# Patient Record
Sex: FEMALE | Race: WHITE
[De-identification: names, ages, dates, MRNs, and addresses within clinical notes are randomized per-mention and may not be internally consistent; named-entity substitution may affect disease eponyms.]

---

## 2017-02-22 ENCOUNTER — HOSPITAL ENCOUNTER (INPATIENT)
Dept: HOSPITAL 17 - NEPA | Age: 72
LOS: 2 days | Discharge: HOME | DRG: 812 | End: 2017-02-24
Attending: HOSPITALIST | Admitting: HOSPITALIST
Payer: MEDICARE

## 2017-02-22 VITALS
RESPIRATION RATE: 18 BRPM | HEART RATE: 78 BPM | TEMPERATURE: 97 F | DIASTOLIC BLOOD PRESSURE: 70 MMHG | SYSTOLIC BLOOD PRESSURE: 159 MMHG | OXYGEN SATURATION: 100 %

## 2017-02-22 VITALS
OXYGEN SATURATION: 99 % | SYSTOLIC BLOOD PRESSURE: 137 MMHG | DIASTOLIC BLOOD PRESSURE: 73 MMHG | HEART RATE: 86 BPM | RESPIRATION RATE: 16 BRPM | TEMPERATURE: 97.5 F

## 2017-02-22 VITALS — BODY MASS INDEX: 18.42 KG/M2 | HEIGHT: 66 IN | WEIGHT: 114.64 LBS

## 2017-02-22 VITALS
OXYGEN SATURATION: 100 % | RESPIRATION RATE: 18 BRPM | DIASTOLIC BLOOD PRESSURE: 70 MMHG | SYSTOLIC BLOOD PRESSURE: 169 MMHG | HEART RATE: 70 BPM

## 2017-02-22 VITALS
DIASTOLIC BLOOD PRESSURE: 53 MMHG | TEMPERATURE: 96.9 F | OXYGEN SATURATION: 100 % | SYSTOLIC BLOOD PRESSURE: 97 MMHG | RESPIRATION RATE: 16 BRPM | HEART RATE: 66 BPM

## 2017-02-22 VITALS
HEART RATE: 69 BPM | RESPIRATION RATE: 16 BRPM | TEMPERATURE: 96.4 F | OXYGEN SATURATION: 98 % | SYSTOLIC BLOOD PRESSURE: 138 MMHG | DIASTOLIC BLOOD PRESSURE: 74 MMHG

## 2017-02-22 DIAGNOSIS — D50.9: Primary | ICD-10-CM

## 2017-02-22 DIAGNOSIS — T40.605A: ICD-10-CM

## 2017-02-22 DIAGNOSIS — G89.29: ICD-10-CM

## 2017-02-22 DIAGNOSIS — Z79.1: ICD-10-CM

## 2017-02-22 DIAGNOSIS — K59.03: ICD-10-CM

## 2017-02-22 DIAGNOSIS — M54.5: ICD-10-CM

## 2017-02-22 DIAGNOSIS — K25.9: ICD-10-CM

## 2017-02-22 DIAGNOSIS — K44.9: ICD-10-CM

## 2017-02-22 DIAGNOSIS — D61.818: ICD-10-CM

## 2017-02-22 DIAGNOSIS — K92.2: ICD-10-CM

## 2017-02-22 LAB
ANION GAP SERPL CALC-SCNC: 7 MEQ/L (ref 5–15)
APTT BLD: 22.6 SEC (ref 24.3–30.1)
BASOPHILS # BLD AUTO: 0 TH/MM3 (ref 0–0.2)
BASOPHILS NFR BLD AUTO: 3 % (ref 0–2)
BASOPHILS NFR BLD: 1.8 % (ref 0–2)
BUN SERPL-MCNC: 16 MG/DL (ref 7–18)
CHLORIDE SERPL-SCNC: 108 MEQ/L (ref 98–107)
DACRYOCYTES BLD QL SMEAR: (no result)
EOSINOPHIL # BLD: 0.1 TH/MM3 (ref 0–0.4)
EOSINOPHIL NFR BLD: 1.8 % (ref 0–4)
EOSINOPHIL NFR BLD: 4 % (ref 0–4)
ERYTHROCYTE [DISTWIDTH] IN BLOOD BY AUTOMATED COUNT: 20.3 % (ref 11.6–17.2)
GFR SERPLBLD BASED ON 1.73 SQ M-ARVRAT: 63 ML/MIN (ref 89–?)
HCO3 BLD-SCNC: 25.6 MEQ/L (ref 21–32)
HCT VFR BLD CALC: 24.1 % (ref 35–46)
HEMO FLAGS: (no result)
INR PPP: 0.9 RATIO
LYMPHOCYTES # BLD AUTO: 1.1 TH/MM3 (ref 1–4.8)
LYMPHOCYTES NFR BLD AUTO: 39.2 % (ref 9–44)
MCH RBC QN AUTO: 23.3 PG (ref 27–34)
MCHC RBC AUTO-ENTMCNC: 31.1 % (ref 32–36)
MCV RBC AUTO: 74.8 FL (ref 80–100)
MONOCYTES NFR BLD: 11.3 % (ref 0–8)
NEUTROPHILS # BLD AUTO: 1.3 TH/MM3 (ref 1.8–7.7)
NEUTROPHILS NFR BLD AUTO: 45.9 % (ref 16–70)
NEUTS BAND # BLD MANUAL: 1.5 TH/MM3 (ref 1.8–7.7)
NEUTS BAND NFR BLD: 1 % (ref 0–6)
NEUTS SEG NFR BLD MANUAL: 53 % (ref 16–70)
OVALOCYTES BLD QL SMEAR: (no result)
PLAT MORPH BLD: NORMAL
PLATELET # BLD: 170 TH/MM3 (ref 150–450)
PLATELET BLD QL SMEAR: NORMAL
POTASSIUM SERPL-SCNC: 4 MEQ/L (ref 3.5–5.1)
PROTHROMBIN TIME: 10.3 SEC (ref 9.8–11.6)
RBC # BLD AUTO: 3.22 MIL/MM3 (ref 4–5.3)
SCAN/DIFF: (no result)
SODIUM SERPL-SCNC: 141 MEQ/L (ref 136–145)
TRANSFERRIN IRON PROFILE: 351 MG/DL (ref 200–360)
WBC # BLD AUTO: 2.7 TH/MM3 (ref 4–11)
WBC DIFF SAMPLE: 100

## 2017-02-22 PROCEDURE — 86920 COMPATIBILITY TEST SPIN: CPT

## 2017-02-22 PROCEDURE — 85610 PROTHROMBIN TIME: CPT

## 2017-02-22 PROCEDURE — 93005 ELECTROCARDIOGRAM TRACING: CPT

## 2017-02-22 PROCEDURE — 30233N1 TRANSFUSION OF NONAUTOLOGOUS RED BLOOD CELLS INTO PERIPHERAL VEIN, PERCUTANEOUS APPROACH: ICD-10-PCS | Performed by: EMERGENCY MEDICINE

## 2017-02-22 PROCEDURE — 86901 BLOOD TYPING SEROLOGIC RH(D): CPT

## 2017-02-22 PROCEDURE — 82746 ASSAY OF FOLIC ACID SERUM: CPT

## 2017-02-22 PROCEDURE — 85007 BL SMEAR W/DIFF WBC COUNT: CPT

## 2017-02-22 PROCEDURE — 88305 TISSUE EXAM BY PATHOLOGIST: CPT

## 2017-02-22 PROCEDURE — C9113 INJ PANTOPRAZOLE SODIUM, VIA: HCPCS

## 2017-02-22 PROCEDURE — 82607 VITAMIN B-12: CPT

## 2017-02-22 PROCEDURE — 85730 THROMBOPLASTIN TIME PARTIAL: CPT

## 2017-02-22 PROCEDURE — 83550 IRON BINDING TEST: CPT

## 2017-02-22 PROCEDURE — 71010: CPT

## 2017-02-22 PROCEDURE — 86850 RBC ANTIBODY SCREEN: CPT

## 2017-02-22 PROCEDURE — 36430 TRANSFUSION BLD/BLD COMPNT: CPT

## 2017-02-22 PROCEDURE — 80053 COMPREHEN METABOLIC PANEL: CPT

## 2017-02-22 PROCEDURE — 88312 SPECIAL STAINS GROUP 1: CPT

## 2017-02-22 PROCEDURE — 85027 COMPLETE CBC AUTOMATED: CPT

## 2017-02-22 PROCEDURE — 96374 THER/PROPH/DIAG INJ IV PUSH: CPT

## 2017-02-22 PROCEDURE — P9016 RBC LEUKOCYTES REDUCED: HCPCS

## 2017-02-22 PROCEDURE — 86900 BLOOD TYPING SEROLOGIC ABO: CPT

## 2017-02-22 PROCEDURE — 85025 COMPLETE CBC W/AUTO DIFF WBC: CPT

## 2017-02-22 PROCEDURE — 80048 BASIC METABOLIC PNL TOTAL CA: CPT

## 2017-02-22 PROCEDURE — 83540 ASSAY OF IRON: CPT

## 2017-02-22 RX ADMIN — HYDROCODONE BITARTRATE AND ACETAMINOPHEN PRN TAB: 5; 325 TABLET ORAL at 23:43

## 2017-02-22 RX ADMIN — SODIUM CHLORIDE, PRESERVATIVE FREE SCH ML: 5 INJECTION INTRAVENOUS at 21:34

## 2017-02-22 RX ADMIN — GABAPENTIN SCH MG: 300 CAPSULE ORAL at 21:33

## 2017-02-22 NOTE — HHI.HP
__________________________________________________





hospitals


Service


[facility] [place]


Primary Care Physician


Malik Morgan MD


Admission Diagnosis


symptomatic anemia, GI bleed


Diagnoses:  


(1) Symptomatic anemia


(2) Chronic low back pain


(3) Upper gastrointestinal bleed


Chief Complaint:  


Shortness of breath, fatigue and increased weakness


Travel History


International Travel<30 Days:  No


Contact w/Intl Traveler <30 Da:  No


Traveled to Known Affected Are:  No


History of Present Illness


71 year-old  female with a known history of anemia, presented to the 

ED for evaluation of 6 week history of generalized fatigue, shortness of breath 

and increased weakness and abnormal lab including hemoglobin of 6.8 drawn 

yesterday 17 by her PCP, which patient has seen due to her symptoms.  

Though she denies any melena or GI bleed, she had a positive fecal occult blood 

test in the ED.  Her last colonoscopy was in 2015.  During her last 

hospitalization, hematology was consulted and patient was treated for iron 

deficiency anemia with Venofer transfusion.





Review of Systems


Other 12 systems reviewed and are negative except for the one mentioned in the 

history of present illness





Past Family Social History


Past Medical History


Anemia


Chronic low back pain


Past Surgical History


Back surgery


Reported Medications


See EMR


Allergies:  


Coded Allergies:  


     No Known Allergies (Unverified , 17)


Family History


Father  from complication of cerebral aneurysm





Physical Exam


Vital Signs





 Vital Signs








  Date Time  Temp Pulse Resp B/P Pulse Ox O2 Delivery O2 Flow Rate FiO2


 


17 15:21  74 16  99 Room Air  


 


17 14:20 97.5 86 16 137/73 99 Room Air  








Physical Exam


GENERAL: This is a well-nourished, well-developed patient, in no apparent 

distress.


SKIN: No rashes, ecchymoses or lesions. Cool and dry.


HEAD: Atraumatic. Normocephalic. No temporal or scalp tenderness.


EYES: Pupils equal round and reactive. Extraocular motions intact. No scleral 

icterus. No injection or drainage. 


ENT: Nose without bleeding, purulent drainage or septal hematoma. Throat 

without erythema, tonsillar hypertrophy or exudate. Uvula midline. Airway 

patent.


NECK: Trachea midline. No JVD or lymphadenopathy. Supple, nontender, no 

meningeal signs.


CARDIOVASCULAR: Regular rate and rhythm without murmurs, gallops, or rubs. 


RESPIRATORY: Clear to auscultation. Breath sounds equal bilaterally. No wheezes

, rales, or rhonchi.  


GASTROINTESTINAL: Abdomen soft, non-tender, nondistended. No hepato-splenomegaly

, or palpable masses. No guarding.


MUSCULOSKELETAL: Extremities without clubbing, cyanosis, or edema. No joint 

tenderness, effusion, or edema noted. No calf tenderness. Negative Homans sign 

bilaterally.


NEUROLOGICAL: Awake and alert. Cranial nerves II through XII intact.  Motor and 

sensory grossly within normal limits. Five out of 5 muscle strength in all 

muscle groups.  Normal speech.


Laboratory





Laboratory Tests








Test 17





 15:40 15:50


 


Prothrombin Time 10.3  


 


Prothromb Time International 0.9  





Ratio  


 


Activated Partial 22.6  





Thromboplast Time  


 


Blood Type O POSITIVE  


 


Antibody Screen NEGATIVE  


 


White Blood Count  2.7 


 


Red Blood Count  3.22 


 


Hemoglobin  7.5 


 


Hematocrit  24.1 


 


Mean Corpuscular Volume  74.8 


 


Mean Corpuscular Hemoglobin  23.3 


 


Mean Corpuscular Hemoglobin  31.1 





Concent  


 


Red Cell Distribution Width  20.3 


 


Platelet Count  170 


 


Mean Platelet Volume  8.4 


 


Neutrophils (%) (Auto)  45.9 


 


Lymphocytes (%) (Auto)  39.2 


 


Monocytes (%) (Auto)  11.3 


 


Eosinophils (%) (Auto)  1.8 


 


Basophils (%) (Auto)  1.8 


 


Neutrophils # (Auto)  1.3 


 


Lymphocytes # (Auto)  1.1 


 


Monocytes # (Auto)  0.3 


 


Eosinophils # (Auto)  0.1 


 


Basophils # (Auto)  0.0 


 


CBC Comment  AUTO DIFF 


 


Differential Total Cells  100 





Counted  


 


Neutrophils % (Manual)  53 


 


Band Neutrophils %  1 


 


Lymphocytes %  32 


 


Monocytes %  7 


 


Eosinophils %  4 


 


Basophils %  3 


 


Neutrophils # (Manual)  1.5 


 


Differential Comment  FINAL DIFF





  MANUAL


 


Platelet Estimate  NORMAL 


 


Platelet Morphology Comment  NORMAL 


 


Tear Drop Cells  1+ 


 


Ovalocytes  1+ 


 


Sodium Level  141 


 


Potassium Level  4.0 


 


Chloride Level  108 


 


Carbon Dioxide Level  25.6 


 


Anion Gap  7 


 


Blood Urea Nitrogen  16 


 


Creatinine  0.88 


 


Estimat Glomerular Filtration  63 





Rate  


 


Random Glucose  90 


 


Calcium Level  8.8 








Result Diagram:  


17 1550                                                                   

             17 1550





Imaging





Last Impressions








Chest X-Ray 17 0000 Signed





Impressions: 





 Service Date/Time:  2017 15:58 - CONCLUSION: No acute 





 disease     Bora Banerjee MD 











Assessment and Plan


Problem List:  


(1) Upper gastrointestinal bleed


ICD Code:  K92.2


Status:  Acute


(2) Symptomatic anemia


ICD Code:  D64.9


Status:  Acute


Assessment and Plan


71-year-old female with





1-Symptomatic anemia: H&H 7.5/24.1 and last H&H on 16 of 14.7/46.2; will 

transfuse 1 unit packed red blood cell now need further evaluation for GI.  

Check Iron profile and treat accordingly.  Consider hematology consultation


2-GI bleed: As patient with fecal occult blood test positive along with 

symptomatic anemia, consult gastroenterology for evaluation for possible 

panendoscopy.  PPI.


3-History of chronic low back pain: Resume outpatient medications


4-DVT prophylaxis: Bilateral SCDs


Code Status


Full code


Discussed Condition With


Patient, , ED physician





Physician Certification


2 Midnight Certification Type:  Admission for Inpatient Services


Order for Inpatient Services


The services are ordered in accordance with Medicare regulations or non-

Medicare payer requirements, as applicable.  In the case of services not 

specified as inpatient-only, they are appropriately provided as inpatient 

services in accordance with the 2-midnight benchmark.


Estimated LOS (days):  2


 days is the estimated time the patient will need to remain in the hospital, 

assuming treatment plan goals are met and no additional complications.


Post-Hospital Plan:  Not yet determined








Ronald Mendoza MD 2017 17:42

## 2017-02-22 NOTE — RADRPT
EXAM DATE/TIME:  02/22/2017 15:58 

 

[facility] COMPARISON:     

CTA ABDOMEN & PELVIS W 3D RECON, November 07, 2015, 20:54.  CHEST PA & LAT, February 23, 2016, 12:31.


 

                     

INDICATIONS :     

Shortness of breath. 

                     

 

MEDICAL HISTORY :     

None.          

 

SURGICAL HISTORY :     

None.   

 

ENCOUNTER:     

Subsequent                                        

 

ACUITY:     

2 weeks      

 

PAIN SCORE:     

0/10

 

LOCATION:      

chest 

 

FINDINGS:     

Retrocardiac double density is consistent with hiatal hernia. There is mild left base atelectasis. Tia
ngs otherwise clear. Inspected. Mediastinal contours are satisfactory.

 

CONCLUSION:     No acute disease

 

 

 

 Bora Banerjee MD on February 22, 2017 at 16:13           

Board Certified Radiologist.

 This report was verified electronically.

## 2017-02-22 NOTE — PD
HPI


Chief Complaint:  Abnormal Results


Time Seen by Provider:  15:20


Travel History


International Travel<30 days:  No


Contact w/Intl Traveler<30days:  No


Traveled to known affect area:  No





History of Present Illness


HPI


The patient was seen and examined in the presence of the nurse.  This patient 

complains of shortness of breath and fatigue.  She has history of chronic 

anemia from iron deficiency.  She had an outpatient blood draw yesterday that 

showed a hemoglobin of 6.8 per patient report.  She denies melena or GI 

bleeding.  Her last colonoscopy was November 2015.  Symptom severity is 

moderate.  No productive cough or chest pain or fever.  No alleviating factors.

  No history of cardiac or pulmonary disease.  Duration one week





PFSH


Past Medical History


Anemia:  Yes


Arthritis:  No


Asthma:  No


Blood Disorders:  No


Anxiety:  No


Depression:  No


Heart Rhythm Problems:  No


Cancer:  No


Cardiovascular Problems:  No


High Cholesterol:  No


Chemotherapy:  No


Chest Pain:  No


Congestive Heart Failure:  No


COPD:  No


Cerebrovascular Accident:  No


Diabetes:  No


Endocrine:  No


Gastrointestinal Disorders:  Yes (gerd, ulcers in the past)


GERD:  No


Genitourinary:  No


Hepatitis:  No


Hiatal Hernia:  Yes


Immune Disorder:  No


Kidney Stones:  No


Musculoskeletal:  No


Neurologic:  Yes (numbness r leg with weakness, foot drop)


Psychiatric:  Yes (ADD)


Reproductive:  No


Respiratory:  No


Migraines:  No


Radiation Therapy:  No


Renal Failure:  No


Seizures:  No


Sleep Apnea:  No


Thyroid Disease:  No


Ulcer:  No


Pregnant?:  Not Pregnant





Past Surgical History


Abdominal Surgery:  No


AICD:  No


Cardiac Surgery:  No


Ear Surgery:  No


Endocrine Surgery:  No


Eye Surgery:  No


Genitourinary Surgery:  No


Gynecologic Surgery:  No


Joint Replacement:  No


Oral Surgery:  No


Pacemaker:  No


Thoracic Surgery:  No


Tonsillectomy:  Yes


Other Surgery:  Yes (back surgery/ disc )





Social History


Alcohol Use:  Yes (DAILY, 2-3 MIXED DRINKS)


Tobacco Use:  No


Substance Use:  No





Allergies-Medications


(Allergen,Severity, Reaction):  


Coded Allergies:  


     No Known Allergies (Unverified , 2/22/17)


Reported Meds & Prescriptions





Reported Meds & Active Scripts


Active


Reported


Gabapentin 300 Mg Cap 300 Mg PO BID


Tizanidine (Tizanidine HCl) 2 Mg Tab 2 Mg PO HS


Hydrocodone-Acetaminophen  mg Tab 1 Tab PO Q6H PRN


Adderall (Amphetamine-Dextroamphetamine) 20 Mg Tab 20 Mg PO BID


     Avoid late evening doses. Space doses at least 4 to 6 hours if more


     than once/day dosing.








Review of Systems


General / Constitutional:  No: Fever


Eyes:  No: Visual changes


HENT:  No: Headaches


Cardiovascular:  No: Chest Pain or Discomfort


Respiratory:  Positive: Shortness of Breath


Gastrointestinal:  No: Abdominal Pain


Genitourinary:  No: Dysuria


Musculoskeletal:  Positive: Weakness,  No: Pain


Skin:  No Rash


Neurologic:  Positive: Weakness


Psychiatric:  No: Depression


Endocrine:  No: Polydipsia


Hematologic/Lymphatic:  No: Easy Bruising





Physical Exam


Narrative


GENERAL: Thin elderly well-developed patient in no apparent distress.


SKIN: Warm and dry.


HEAD: Atraumatic. Normocephalic. 


EYES: Pupils equal and round. No scleral icterus. No injection or drainage. 


ENT: No nasal bleeding or discharge.  Mucous membranes pink and moist.


NECK: Trachea midline. No JVD. 


CARDIOVASCULAR: Regular rate and rhythm.  No murmur appreciated.


RESPIRATORY: No accessory muscle use. Clear to auscultation. Breath sounds 

equal bilaterally. 


GASTROINTESTINAL: Abdomen soft, non-tender, nondistended. Hepatic and splenic 

margins not palpable. 


MUSCULOSKELETAL: No obvious deformities. No clubbing.  No cyanosis.  No edema. 


NEUROLOGICAL: Awake and alert. No obvious cranial nerve deficits.  Motor 

grossly within normal limits. Normal speech.


PSYCHIATRIC: Appropriate mood and affect; insight and judgment normal.


Rectal: Normal tone, no mass, stool is dark brown





Data


Data


Last Documented VS





Vital Signs








  Date Time  Temp Pulse Resp B/P Pulse Ox O2 Delivery O2 Flow Rate FiO2


 


2/22/17 15:21  74 16  99 Room Air  


 


2/22/17 14:20 97.5   137/73    








Orders





 Complete Blood Count With Diff (2/22/17 15:30)


Basic Metabolic Panel (Bmp) (2/22/17 15:30)


Iv Access Insert/Monitor (2/22/17 15:30)


Chest, Single Ap (2/22/17 )


Electrocardiogram (2/22/17 )


Prothrombin Time / Inr (Pt) (2/22/17 15:52)


Act Partial Throm Time (Ptt) (2/22/17 15:52)


Type And Screen (2/22/17 15:52)


Enalaprilat Inj (Vasotec  Inj) (2/22/17 16:00)


Admit Order (Ed Use Only) (2/22/17 16:45)





Labs








 Laboratory Tests








Test 2/22/17 2/22/17





 15:40 15:50


 


Prothrombin Time 10.3 SEC 


 


Prothromb Time International 0.9 RATIO 





Ratio  


 


Activated Partial 22.6 SEC 





Thromboplast Time  


 


White Blood Count  2.7 TH/MM3


 


Red Blood Count  3.22 MIL/MM3


 


Hemoglobin  7.5 GM/DL


 


Hematocrit  24.1 %


 


Mean Corpuscular Volume  74.8 FL


 


Mean Corpuscular Hemoglobin  23.3 PG


 


Mean Corpuscular Hemoglobin  31.1 %





Concent  


 


Red Cell Distribution Width  20.3 %


 


Platelet Count  170 TH/MM3


 


Mean Platelet Volume  8.4 FL


 


Neutrophils (%) (Auto)  45.9 %


 


Lymphocytes (%) (Auto)  39.2 %


 


Monocytes (%) (Auto)  11.3 %


 


Eosinophils (%) (Auto)  1.8 %


 


Basophils (%) (Auto)  1.8 %


 


Neutrophils # (Auto)  1.3 TH/MM3


 


Lymphocytes # (Auto)  1.1 TH/MM3


 


Monocytes # (Auto)  0.3 TH/MM3


 


Eosinophils # (Auto)  0.1 TH/MM3


 


Basophils # (Auto)  0.0 TH/MM3


 


CBC Comment  AUTO DIFF 


 


Sodium Level  141 MEQ/L


 


Potassium Level  4.0 MEQ/L


 


Chloride Level  108 MEQ/L


 


Calcium Level  8.8 MG/DL














MDM


Medical Decision Making


Medical Screen Exam Complete:  Yes


Emergency Medical Condition:  Yes


Medical Record Reviewed:  Yes


Differential Diagnosis


Gastric ulcer, esophagitis, gastritis, symptomatic anemia


Narrative Course


I have reviewed the patient's electronic medical record.  Reviewed GI 

consultation from 2015 as well as hematology consultation.  She received iron 

therapy and her hemoglobin was 14.7 in June 2016





IV placed


CBC shows hemoglobin of 7.5 with leukopenia and normal platelet count


Metabolic profile shows normal sodium and potassium, creatinine still pending


Coagulation studies are normal


Type and screen was sent


Stool is Hemoccult positive suggesting GI loss as  the cause


I gave her dose of IV Protonix


Patient has symptomatic anemia from suspected upper GI bleed


Case reviewed with hospitalist will admit


I reviewed her EKG which shows sinus rhythm but no ST elevation


I reviewed her chest x-ray which is normal


Extended cardiac monitoring reveals sinus rhythm with occasional PVC.  I 

believe her dyspnea is a manifestation of symptomatic anemia and not pulmonary 

disease





[facility] Point of Care


Internal Pos. & Neg. Controls:  Passed


Fecal Specimen Occult Blood:  Positive





Diagnosis





 Primary Impression:  


 Symptomatic anemia


 Additional Impression:  


 Upper gastrointestinal bleed





Admitting Information


Admitting Physician Requests:  Admit








Gagandeep Car MD Feb 22, 2017 15:58

## 2017-02-22 NOTE — EKG
Date Performed: 02/22/2017       Time Performed: 16:28:37

 

PTAGE:      71 years

 

EKG:      NORMAL Sinus rhythm 

 

 WITH PACS INCOMPLETE RIGHT BUNDLE BRANCH BLOCK ABNORMAL RHYTHM ECG

 

PREVIOUS TRACING       : 02/23/2016 11.46 Compared to prior tracing no significant change

 

DOCTOR:   Carlos Khan  Interpretating Date/Time  02/22/2017 22:47:04

## 2017-02-23 VITALS
SYSTOLIC BLOOD PRESSURE: 140 MMHG | RESPIRATION RATE: 16 BRPM | HEART RATE: 65 BPM | TEMPERATURE: 97.1 F | OXYGEN SATURATION: 98 % | DIASTOLIC BLOOD PRESSURE: 70 MMHG

## 2017-02-23 VITALS
SYSTOLIC BLOOD PRESSURE: 186 MMHG | DIASTOLIC BLOOD PRESSURE: 85 MMHG | OXYGEN SATURATION: 96 % | RESPIRATION RATE: 16 BRPM | TEMPERATURE: 97.1 F | HEART RATE: 69 BPM

## 2017-02-23 VITALS
DIASTOLIC BLOOD PRESSURE: 83 MMHG | OXYGEN SATURATION: 100 % | RESPIRATION RATE: 16 BRPM | SYSTOLIC BLOOD PRESSURE: 188 MMHG | TEMPERATURE: 96.6 F | HEART RATE: 60 BPM

## 2017-02-23 VITALS
SYSTOLIC BLOOD PRESSURE: 122 MMHG | HEART RATE: 61 BPM | RESPIRATION RATE: 16 BRPM | OXYGEN SATURATION: 98 % | DIASTOLIC BLOOD PRESSURE: 78 MMHG | TEMPERATURE: 96.8 F

## 2017-02-23 VITALS
TEMPERATURE: 96.7 F | RESPIRATION RATE: 16 BRPM | HEART RATE: 77 BPM | DIASTOLIC BLOOD PRESSURE: 77 MMHG | OXYGEN SATURATION: 98 % | SYSTOLIC BLOOD PRESSURE: 145 MMHG

## 2017-02-23 VITALS
OXYGEN SATURATION: 98 % | DIASTOLIC BLOOD PRESSURE: 74 MMHG | HEART RATE: 72 BPM | TEMPERATURE: 96.2 F | SYSTOLIC BLOOD PRESSURE: 128 MMHG | RESPIRATION RATE: 16 BRPM

## 2017-02-23 VITALS — OXYGEN SATURATION: 98 %

## 2017-02-23 LAB
ALP SERPL-CCNC: 74 U/L (ref 45–117)
ALT SERPL-CCNC: 15 U/L (ref 10–53)
ANION GAP SERPL CALC-SCNC: 7 MEQ/L (ref 5–15)
AST SERPL-CCNC: 11 U/L (ref 15–37)
BASOPHILS # BLD AUTO: 0 TH/MM3 (ref 0–0.2)
BASOPHILS NFR BLD: 1.4 % (ref 0–2)
BILIRUB SERPL-MCNC: 0.3 MG/DL (ref 0.2–1)
BUN SERPL-MCNC: 13 MG/DL (ref 7–18)
CHLORIDE SERPL-SCNC: 108 MEQ/L (ref 98–107)
EOSINOPHIL # BLD: 0.1 TH/MM3 (ref 0–0.4)
EOSINOPHIL NFR BLD: 3.2 % (ref 0–4)
ERYTHROCYTE [DISTWIDTH] IN BLOOD BY AUTOMATED COUNT: 19.6 % (ref 11.6–17.2)
GFR SERPLBLD BASED ON 1.73 SQ M-ARVRAT: 95 ML/MIN (ref 89–?)
HCO3 BLD-SCNC: 26.1 MEQ/L (ref 21–32)
HCT VFR BLD CALC: 25.1 % (ref 35–46)
HEMO FLAGS: (no result)
LYMPHOCYTES # BLD AUTO: 0.7 TH/MM3 (ref 1–4.8)
LYMPHOCYTES NFR BLD AUTO: 29.8 % (ref 9–44)
MCH RBC QN AUTO: 24 PG (ref 27–34)
MCHC RBC AUTO-ENTMCNC: 31.4 % (ref 32–36)
MCV RBC AUTO: 76.3 FL (ref 80–100)
MONOCYTES NFR BLD: 11.9 % (ref 0–8)
NEUTROPHILS # BLD AUTO: 1.3 TH/MM3 (ref 1.8–7.7)
NEUTROPHILS NFR BLD AUTO: 53.7 % (ref 16–70)
PLATELET # BLD: 144 TH/MM3 (ref 150–450)
POTASSIUM SERPL-SCNC: 3.7 MEQ/L (ref 3.5–5.1)
RBC # BLD AUTO: 3.29 MIL/MM3 (ref 4–5.3)
SCAN/DIFF: (no result)
SODIUM SERPL-SCNC: 141 MEQ/L (ref 136–145)
TARGETS BLD QL SMEAR: (no result)
VIT B12 SERPL-MCNC: 1076 PG/ML (ref 193–986)
WBC # BLD AUTO: 2.5 TH/MM3 (ref 4–11)

## 2017-02-23 RX ADMIN — SODIUM CHLORIDE, PRESERVATIVE FREE SCH ML: 5 INJECTION INTRAVENOUS at 09:19

## 2017-02-23 RX ADMIN — GABAPENTIN SCH MG: 300 CAPSULE ORAL at 09:19

## 2017-02-23 RX ADMIN — SODIUM CHLORIDE, PRESERVATIVE FREE SCH ML: 5 INJECTION INTRAVENOUS at 20:29

## 2017-02-23 RX ADMIN — GABAPENTIN SCH MG: 300 CAPSULE ORAL at 20:26

## 2017-02-23 RX ADMIN — PANTOPRAZOLE SCH MG: 40 TABLET, DELAYED RELEASE ORAL at 09:19

## 2017-02-23 RX ADMIN — DEXTROAMPHETAMINE SACCHARATE, AMPHETAMINE ASPARTATE, DEXTROAMPHETAMINE SULFATE, AND AMPHETAMINE SULFATE SCH MG: 5; 5; 5; 5 TABLET ORAL at 08:00

## 2017-02-23 RX ADMIN — DEXTROAMPHETAMINE SACCHARATE, AMPHETAMINE ASPARTATE, DEXTROAMPHETAMINE SULFATE, AND AMPHETAMINE SULFATE SCH MG: 5; 5; 5; 5 TABLET ORAL at 15:19

## 2017-02-23 RX ADMIN — HYDROCODONE BITARTRATE AND ACETAMINOPHEN PRN TAB: 10; 325 TABLET ORAL at 15:22

## 2017-02-23 RX ADMIN — HYDROCODONE BITARTRATE AND ACETAMINOPHEN PRN TAB: 10; 325 TABLET ORAL at 23:36

## 2017-02-23 RX ADMIN — SODIUM CHLORIDE SCH MLS/HR: 900 INJECTION, SOLUTION INTRAVENOUS at 15:19

## 2017-02-23 RX ADMIN — HYDROCODONE BITARTRATE AND ACETAMINOPHEN PRN TAB: 5; 325 TABLET ORAL at 09:21

## 2017-02-23 NOTE — MB
cc:

RONALD PINZON RUBY ANNE E. M.D.

****

 

 

DATE OF CONSULTATION:  2017

 

 1945

 

REFERRING PHYSICIAN

Dr. Ronald Pinzon.

 

CHIEF COMPLAINT

Dr. Pinzon requested consultation for Mrs. Klein regarding iron deficiency

anemia.

 

HISTORY OF PRESENT ILLNESS

Mrs. Klein is a 71-year-old woman, well-known patient from hematology clinic.

She was initially seen 2015.  At that time she presented with viral

gastroenteritis and she was having nausea and vomiting.  She was referred to

the emergency room and her hemoglobin was found to be 6.2.  She was confirmed

to have iron deficiency.  She was treated with parenteral iron therapy and did

extremely well.  She responded well to the iron treatment and was followed in

hematology clinic for sometime.  Her hemoglobin remained stable.  Her last

appointment with me was on May 24, 2016.  Her hemoglobin of 14.7.  Her ferritin

was 96.  She was discharged from hematology clinic to follow up with her

primary physician.  She was agreeable to this.

 

Mrs. Klein describes feeling fatigue in retrospect, possibly weeks prior to

coming in.  She describes having a blood test coordinated by Dr. Eddie arroyo

be in January, suggested that she has recurrence of her anemia.  She started to

chew more ice. She went to [place] to visit some family.  She returned to have

a hemoglobin repeated and was found to have a hemoglobin of 6.8.  She was

referred to the emergency room.  She denies any overt bleeding.  She denies any

abdominal discomfort.  She has noted some bruising.  She admits to taking an

occasional ibuprofen for her chronic pain.  She is aware of the need to avoid

the ibuprofen in light of her history of GI bleeding.

 

On admission to the hospital she was found to have a hemoglobin 7.5, she was

hemodynamically stable.  Her platelet count was normal, white blood cell count

was slightly decreased. She was treated with parenteral iron therapy.  Her

hemoglobin increased to 7.9.  She is feeling better. A ferritin was not

performed but her laboratory evaluation is consistent with iron deficiency with

1.6% saturation and serum iron of 8, TIBC was elevated at 491.  She denies any

GI symptoms.  She is eating well.  She denies any symptoms from her hiatal

hernia.  She has no sick contact.  She is feeling fatigue, is the most

complaint and short of breath.  She feels that she should have come into

hematology clinic before.  The rest of her review of systems is negative.

 

PAST MEDICAL HISTORY

1.   Iron deficiency anemia.

2.   Chronic pain.

3.   History of gastroenteritis.

4.   Leukopenia.

 

FAMILY HISTORY

Father  of cerebral aneurysm in his 50s.  Mother  of a stroke in her

80s.

 

SOCIAL HISTORY

She denies any alcohol or illicit drug use.  She drinks wine and cocktails on a

daily basis.

 

ALLERGIES

NO KNOWN DRUG ALLERGIES.

 

PAST SURGICAL HISTORY

Back surgery.

 

CURRENT MEDICATIONS

1. Iron sucrose.

2. Protonix.

3. Neurontin.

4. Zanaflex.

 

PHYSICAL EXAMINATION

VITAL SIGNS: Temperature 96.2, heart rate 72, respiratory rate 16, blood

pressure 128/74, saturation 98%.

GENERAL:  Mrs. Klein is a well-developed, well-nourished petite elderly woman in

no acute distress.  She has fair skin and some pallor.  Her pupils are round,

reactive to light and accommodation.  Conjunctivae is pale.  Oropharynx is

clear.

NECK: Supple.

LUNGS: Clear to auscultation.

CARDIOVASCULAR: Exam reveals normal rate, rhythm.

ABDOMEN: Abdomen is benign.

EXTREMITIES: Lower extremities with no edema.  She has senile purpura, bruising

of her forearms.

 

LABORATORY DATA

Labs as described above.  Chemistry with a BUN of 13, creatinine 0.62.

 

ASSESSMENT/PLAN

Mrs. Klein is a 71-year-old woman, well-known patient with history of iron

deficiency anemia secondary to GI blood loss.  We discussed at length her risk

is the hiatal hernia which at the time is giving her no specific symptoms.  I

defer to GI for further evaluation.  She has had extensive GI evaluation that

has been negative in the past.  The only evidence we have of GI loss of blood

is the recurrence of her iron deficiency after it has been completely corrected

in May 2016.

 

I concur with Dr. Pinzon of giving her parenteral iron therapy.  I recommend

withholding blood transfusion as she is hemodynamically stable.  She responded

to iron Injectafer in the past. We could coordinate this in our clinic.  She is

quite familiar with the clinic system and will obtain approval and anticipate

treating her on Monday.  We will monitor closely her progress during her

hospital course.  She has no overt signs of bleeding.  She is about to have

lunch and is doing well.  She is eager to go home.  She is recently .

Her questions were answered to her satisfaction.

 

 

                              _________________________________

                              MD KATHY Edgar

D:  2017/2:09 PM

T:  2017/2:22 PM

Visit #:  [id number]

Job #:  [id number]

## 2017-02-23 NOTE — PD.CONS
HPI


History of Present Illness


This is a 71 year old lady with a hx anemia who presented with sob on exertion 

and fatigue. She had an outpt blood draw a few days ago that showed hgb 6.8.  

About 2 months ago she began experiencing gradual onset of these symptoms.  In 

 she experienced similar symptoms and hgb less than 7 and was treated with 

IV iron and received blood and her hgb and symptoms improved greatly.  At that 

time, 2015, she also had colonoscopy with poor prep and recommended repeat 

in 1 year, and EGD which showed a large ulcer and a hiatal hernia.  She says at 

this time she was taking daily ibuprofen for back pain.  She also had a capsule 

endoscopy 10y prior which did not find any bleeding.  She has a hx back pain 

for which she had surgery last year and still bothers her.  She takes 

hydrocodone daily and has constipation which she controls with benefiber and 

stool softeners.  No family hx cancer and no one else in her family has 

problems with anemia but she says she has had it her whole life.  She also 

bruises easily which she said she has experienced for the last 10 years.  She 

denies abdominal pain, n/v, hematemesis, hematochezia, melena.





PFSH


Past Medical History


Anemia


Chronic low back pain


hiatal hernia


Past Surgical History


Back surgery


Coded Allergies:  


     No Known Allergies (Unverified , 17)


Family History


Father  from complication of cerebral aneurysm





Review of Systems


Constitutional:  COMPLAINS OF: Fatigue,  DENIES: Diaphoretic episodes, Fever, 

Weight gain, Weight loss, Chills, Dizziness, Change in appetite, Night Sweats


Respiratory:  COMPLAINS OF: Shortness of breath


Gastrointestinal:  DENIES: Abdominal pain, Black stools, Bloody stools, 

Constipation, Diarrhea, Nausea, Vomiting, Difficulty Swallowing, Anorexia, 

Odynophagia, Swelling of Abdomen, Heartburn, Hematemesis


Musculoskeletal:  COMPLAINS OF: Back pain


Hematologic/lymphatic:  COMPLAINS OF: Bruising





GI Exam


Vitals I&O





 Vital Signs








  Date Time  Temp Pulse Resp B/P Pulse Ox O2 Delivery O2 Flow Rate FiO2


 


17 12:00 96.2 72 16 128/74 98   


 


17 10:05     98   


 


17 08:00 97.1 69 16 186/85 96   


 


17 05:00 97.1 65 16 140/70 98   


 


17 01:20 96.8 61 16 122/78 98   


 


17 00:57     98   


 


17 22:52 96.9 66 16 97/53 100   


 


17 22:30 96.4 69 16 138/74 98   


 


17 21:40 97.0 78 18 159/70 100   


 


17 18:12  70 18 169/70 100 Room Air  


 


17 15:21  74 16  99 Room Air  








 I/O








 17





 07:00 15:00 23:00 07:00 15:00 23:00


 


Intake Total    200 ml 480 ml 


 


Output Total     1100 ml 


 


Balance    200 ml -620 ml 


 


      


 


Intake Oral    200 ml 480 ml 


 


Output Urine Total     1100 ml 


 


# Voids   1 2  


 


# Bowel Movements    0  








Laboratory











Test 17





 15:40 15:50 17:34 08:02


 


Prothrombin Time 10.3 SEC   


 


Prothromb Time International 0.9 RATIO   





Ratio    


 


Activated Partial 22.6 SEC   





Thromboplast Time    


 


Blood Type O POSITIVE    


 


Antibody Screen NEGATIVE    


 


White Blood Count  2.7 TH/MM3  2.5 TH/MM3


 


Red Blood Count  3.22 MIL/MM3  3.29 MIL/MM3


 


Hemoglobin  7.5 GM/DL  7.9 GM/DL


 


Hematocrit  24.1 %  25.1 %


 


Mean Corpuscular Volume  74.8 FL  76.3 FL


 


Mean Corpuscular Hemoglobin  23.3 PG  24.0 PG


 


Mean Corpuscular Hemoglobin  31.1 %  31.4 %





Concent    


 


Red Cell Distribution Width  20.3 %  19.6 %


 


Platelet Count  170 TH/MM3  144 TH/MM3


 


Mean Platelet Volume  8.4 FL  8.3 FL


 


Neutrophils (%) (Auto)  45.9 %  53.7 %


 


Lymphocytes (%) (Auto)  39.2 %  29.8 %


 


Monocytes (%) (Auto)  11.3 %  11.9 %


 


Eosinophils (%) (Auto)  1.8 %  3.2 %


 


Basophils (%) (Auto)  1.8 %  1.4 %


 


Neutrophils # (Auto)  1.3 TH/MM3  1.3 TH/MM3


 


Lymphocytes # (Auto)  1.1 TH/MM3  0.7 TH/MM3


 


Monocytes # (Auto)  0.3 TH/MM3  0.3 TH/MM3


 


Eosinophils # (Auto)  0.1 TH/MM3  0.1 TH/MM3


 


Basophils # (Auto)  0.0 TH/MM3  0.0 TH/MM3


 


CBC Comment  AUTO DIFF   AUTO DIFF 


 


Differential Total Cells  100   





Counted    


 


Neutrophils % (Manual)  53 %  


 


Band Neutrophils %  1 %  


 


Lymphocytes %  32 %  


 


Monocytes %  7 %  


 


Eosinophils %  4 %  


 


Basophils %  3 %  


 


Neutrophils # (Manual)  1.5 TH/MM3  


 


Differential Comment  FINAL DIFF  AUTO DIFF





  MANUAL  CONFIRMED


 


Platelet Estimate  NORMAL   


 


Platelet Morphology Comment  NORMAL   


 


Tear Drop Cells  1+   


 


Ovalocytes  1+   


 


Sodium Level  141 MEQ/L  141 MEQ/L


 


Potassium Level  4.0 MEQ/L  3.7 MEQ/L


 


Chloride Level  108 MEQ/L  108 MEQ/L


 


Carbon Dioxide Level  25.6 MEQ/L  26.1 MEQ/L


 


Anion Gap  7 MEQ/L  7 MEQ/L


 


Blood Urea Nitrogen  16 MG/DL  13 MG/DL


 


Creatinine  0.88 MG/DL  0.62 MG/DL


 


Estimat Glomerular Filtration  63 ML/MIN  95 ML/MIN





Rate    


 


Random Glucose  90 MG/DL  84 MG/DL


 


Calcium Level  8.8 MG/DL  8.4 MG/DL


 


Iron Level  8 MCG/DL  


 


Total Iron Binding Capacity  491 MCG/DL  


 


Percent Iron Saturation  1.6 %  


 


Crossmatch   Leukocyte-Reduced 





   Red Blood 





   Cells 


 


Blood Bank Comment     


 


Target Cells    1+ 


 


Total Bilirubin    0.3 MG/DL


 


Aspartate Amino Transf    11 U/L





(AST/SGOT)    


 


Alanine Aminotransferase    15 U/L





(ALT/SGPT)    


 


Alkaline Phosphatase    74 U/L


 


Total Protein    6.0 GM/DL


 


Albumin    3.1 GM/DL


 


Vitamin B12 Level    1076 PG/ML


 


Folate    7.8 NG/ML








Physical Examination


HEENT: normocephalic; atraumatic; no jaundice.


CHEST:  CTA


CARDIAC:  RRR


ABDOMEN:  Soft, nondistended, nontender; no hepatosplenomegaly; bowel sounds 

are present in all four quadrants.


EXTREMITIES: No clubbing, cyanosis, or edema.


SKIN:  Normal; no rash; no jaundice.


CNS:  No focal deficits; alert and oriented times three.





Assessment and Plan


Plan


ASSESSMENT


- Iron deficiency Anemia/hemocult pos, HH 7.9/25.1, Iron 8, TIBC 491.  Getting 

iron & blood transfusions.  Pt has hx anemia & peptic ulcer disease and last 

had EGD/colonoscopy  which showed large ulcer, hiatal hernia, chemical 

gastropathy. Colonoscopy prep was poor.  Capsule endoscopy 10y prior and showed 

no bleeding. PPI


- Hx chronic low back pain  





PLAN


- EGD/colonoscopy


- obtain consents


- clear liquids and NPO after midnight


- golytely prep


- PPI


- iron transfusions


- hematology following


- monitor HH


- transfuse as necessary


-This patient was seen by myself and Dr. Moe and this note is written on 

his behalf








Evelin Strong 2017 15:35

## 2017-02-23 NOTE — HHI.PR
Subjective


Remarks


Follow-up symptomatic anemia/GI bleed /iron deficiency anemia


02/23/17-patient seen and examined; she was transfused 1 unit packed red blood 

cell and no acute event overnight.  No report of GI bleed, hematuria or 

hemoptysis.





Objective


Vitals





 Vital Signs








  Date Time  Temp Pulse Resp B/P Pulse Ox O2 Delivery O2 Flow Rate FiO2


 


2/23/17 05:00 97.1 65 16 140/70 98   


 


2/23/17 01:20 96.8 61 16 122/78 98   


 


2/23/17 00:57     98   


 


2/22/17 22:52 96.9 66 16 97/53 100   


 


2/22/17 22:30 96.4 69 16 138/74 98   


 


2/22/17 21:40 97.0 78 18 159/70 100   


 


2/22/17 18:12  70 18 169/70 100 Room Air  


 


2/22/17 15:21  74 16  99 Room Air  


 


2/22/17 14:20 97.5 86 16 137/73 99 Room Air  








 I/O








 2/22/17 2/22/17 2/22/17 2/23/17 2/23/17 2/23/17





 07:00 15:00 23:00 07:00 15:00 23:00


 


Intake Total    200 ml  


 


Balance    200 ml  


 


      


 


Intake Oral    200 ml  


 


# Voids   1 2  


 


# Bowel Movements    0  








Result Diagram:  


2/22/17 1550                                                                   

             2/22/17 1550





Imaging





Last Impressions








Chest X-Ray 2/22/17 0000 Signed





Impressions: 





 Service Date/Time:  Wednesday, February 22, 2017 15:58 - CONCLUSION: No acute 





 disease     Bora Banerjee MD 








Objective Remarks


GENERAL: NAD


SKIN: Warm and dry.


HEAD: Normocephalic.


EYES: No scleral icterus. No injection or drainage. 


NECK: Supple, trachea midline. No JVD or lymphadenopathy.


CARDIOVASCULAR: Regular rate and rhythm without murmurs, gallops, or rubs. 


RESPIRATORY: Breath sounds equal bilaterally. No accessory muscle use.


GASTROINTESTINAL: Abdomen soft, non-tender, nondistended. 


MUSCULOSKELETAL: No cyanosis, or edema. 


BACK: Nontender without obvious deformity. No CVA tenderness.








A/P


Problem List:  


(1) Upper gastrointestinal bleed


ICD Code:  K92.2


Status:  Acute


(2) Symptomatic anemia


ICD Code:  D64.9


Status:  Acute


(3) Iron deficiency anemia


ICD Code:  D50.9


Status:  Acute


Assessment and Plan


71-year-old female with





1-Symptomatic anemia: H&H 7.5/24.1 and last H&H on 05/23/16 of 14.7/46.2; 

transfused 1 unit packed red blood cell 02/22/17 and H&H pending this morning.  


2-GI bleed: As patient with fecal occult blood test positive along with 

symptomatic anemia, gastroenterology consult dictation pending for evaluation 

for possible panendoscopy.  PPI.


3- Ironic deficiency anemia: Likely secondary to chronic GI loss, will check B-

12 and folate level and will transfuse Venofer 200 mg IV daily 2-3 doses 

starting today 02/23/17 and consult hematology


4-History of chronic low back pain: Continue outpatient medications


4-DVT prophylaxis: Bilateral SCDs








Ronald Mendoza MD Feb 23, 2017 08:52

## 2017-02-24 VITALS
SYSTOLIC BLOOD PRESSURE: 165 MMHG | TEMPERATURE: 95.8 F | RESPIRATION RATE: 16 BRPM | OXYGEN SATURATION: 97 % | HEART RATE: 61 BPM | DIASTOLIC BLOOD PRESSURE: 85 MMHG

## 2017-02-24 VITALS
RESPIRATION RATE: 20 BRPM | SYSTOLIC BLOOD PRESSURE: 166 MMHG | TEMPERATURE: 97.4 F | HEART RATE: 72 BPM | DIASTOLIC BLOOD PRESSURE: 74 MMHG | OXYGEN SATURATION: 96 %

## 2017-02-24 VITALS
SYSTOLIC BLOOD PRESSURE: 167 MMHG | RESPIRATION RATE: 20 BRPM | TEMPERATURE: 96.9 F | HEART RATE: 67 BPM | DIASTOLIC BLOOD PRESSURE: 79 MMHG | OXYGEN SATURATION: 100 %

## 2017-02-24 VITALS — DIASTOLIC BLOOD PRESSURE: 79 MMHG | SYSTOLIC BLOOD PRESSURE: 167 MMHG

## 2017-02-24 LAB
BASOPHILS # BLD AUTO: 0.1 TH/MM3 (ref 0–0.2)
BASOPHILS NFR BLD: 1.7 % (ref 0–2)
EOSINOPHIL # BLD: 0.1 TH/MM3 (ref 0–0.4)
EOSINOPHIL NFR BLD: 3.5 % (ref 0–4)
ERYTHROCYTE [DISTWIDTH] IN BLOOD BY AUTOMATED COUNT: 20.2 % (ref 11.6–17.2)
HCT VFR BLD CALC: 24.4 % (ref 35–46)
HEMO FLAGS: (no result)
LYMPHOCYTES # BLD AUTO: 0.8 TH/MM3 (ref 1–4.8)
LYMPHOCYTES NFR BLD AUTO: 23.5 % (ref 9–44)
MCH RBC QN AUTO: 23.6 PG (ref 27–34)
MCHC RBC AUTO-ENTMCNC: 30.9 % (ref 32–36)
MCV RBC AUTO: 76.3 FL (ref 80–100)
MONOCYTES NFR BLD: 10.6 % (ref 0–8)
NEUTROPHILS # BLD AUTO: 2.1 TH/MM3 (ref 1.8–7.7)
NEUTROPHILS NFR BLD AUTO: 60.7 % (ref 16–70)
PLATELET # BLD: 151 TH/MM3 (ref 150–450)
RBC # BLD AUTO: 3.2 MIL/MM3 (ref 4–5.3)
SCAN/DIFF: (no result)
WBC # BLD AUTO: 3.5 TH/MM3 (ref 4–11)

## 2017-02-24 PROCEDURE — 0DB68ZX EXCISION OF STOMACH, VIA NATURAL OR ARTIFICIAL OPENING ENDOSCOPIC, DIAGNOSTIC: ICD-10-PCS | Performed by: INTERNAL MEDICINE

## 2017-02-24 RX ADMIN — GABAPENTIN SCH MG: 300 CAPSULE ORAL at 10:25

## 2017-02-24 RX ADMIN — SODIUM CHLORIDE, PRESERVATIVE FREE SCH ML: 5 INJECTION INTRAVENOUS at 10:31

## 2017-02-24 RX ADMIN — SODIUM CHLORIDE SCH MLS/HR: 900 INJECTION, SOLUTION INTRAVENOUS at 10:26

## 2017-02-24 RX ADMIN — PANTOPRAZOLE SCH MG: 40 TABLET, DELAYED RELEASE ORAL at 10:25

## 2017-02-24 RX ADMIN — DEXTROAMPHETAMINE SACCHARATE, AMPHETAMINE ASPARTATE, DEXTROAMPHETAMINE SULFATE, AND AMPHETAMINE SULFATE SCH MG: 5; 5; 5; 5 TABLET ORAL at 08:00

## 2017-02-24 RX ADMIN — HYDROCODONE BITARTRATE AND ACETAMINOPHEN PRN TAB: 10; 325 TABLET ORAL at 08:54

## 2017-02-24 NOTE — PD.ONC.PN
Subjective


Subjective


Remarks


Afebrile overnight. Patient resting comfortably without complaint.





Objective


Data











  Date Time  Temp Pulse Resp B/P Pulse Ox O2 Delivery O2 Flow Rate FiO2


 


2/24/17 12:00 97.4 72 20 166/74 96   


 


2/24/17 09:54   20     


 


2/24/17 09:38    167/79    


 


2/24/17 08:48 96.9 67 20 167/79 100   


 


2/24/17 08:03  73 16 147/68 98   


 


2/24/17 07:55  72 16 129/68 96   


 


2/24/17 07:45 98.5 75 16 131/67 95   


 


2/24/17 00:00 95.8 61 16 165/85 97   


 


2/23/17 20:00 96.6 60 16 188/83 100   


 


2/23/17 16:00 96.7 77 16 145/77 98   














 2/24/17 2/24/17 2/24/17





 07:00 15:00 23:00


 


Intake Total  200 ml 


 


Balance  200 ml 








Result Diagram:  


2/24/17 0628                                                                   

             2/23/17 0802





Laboratory Results





Laboratory Tests








Test 2/24/17





 06:28


 


White Blood Count 3.5 TH/MM3


 


Red Blood Count 3.20 MIL/MM3


 


Hemoglobin 7.5 GM/DL


 


Hematocrit 24.4 %


 


Mean Corpuscular Volume 76.3 FL


 


Mean Corpuscular Hemoglobin 23.6 PG


 


Mean Corpuscular Hemoglobin 30.9 %





Concent 


 


Red Cell Distribution Width 20.2 %


 


Platelet Count 151 TH/MM3


 


Mean Platelet Volume 8.2 FL


 


Neutrophils (%) (Auto) 60.7 %


 


Lymphocytes (%) (Auto) 23.5 %


 


Monocytes (%) (Auto) 10.6 %


 


Eosinophils (%) (Auto) 3.5 %


 


Basophils (%) (Auto) 1.7 %


 


Neutrophils # (Auto) 2.1 TH/MM3


 


Lymphocytes # (Auto) 0.8 TH/MM3


 


Monocytes # (Auto) 0.4 TH/MM3


 


Eosinophils # (Auto) 0.1 TH/MM3


 


Basophils # (Auto) 0.1 TH/MM3


 


CBC Comment AUTO DIFF 


 


Differential Comment AUTO DIFF





 CONFIRMED











Administered Medications








 Medications


  (Trade)  Dose


 Ordered  Sig/Belen


 Route


 PRN Reason  Start Time


 Stop Time Status Last Admin


Dose Admin


 


 IV Flush


  (NS Flush)  2 ml  BID


 FLUSH


   2/22/17 21:00


    2/24/17 10:31


 


 


 Ondansetron HCl


  (Zofran Inj)  4 mg  Q6H  PRN


 IVP


 NAUSEA OR VOMITING  2/22/17 17:45


    2/23/17 22:54


 


 


 Enalaprilat


  (Vasotec  Inj)  1.25 mg  Q6H  PRN


 IV PUSH


 SBP>160, DBP>90  2/22/17 17:45


    2/23/17 21:09


 


 


 Gabapentin


  (Neurontin)  300 mg  BID


 PO


   2/22/17 21:00


    2/24/17 10:25


 


 


 Tizanidine HCl


  (Zanaflex)  2 mg  HS


 PO


   2/22/17 21:00


    2/23/17 20:27


 


 


 Pantoprazole


 Sodium 40 mg  40 mg  DAILY


 PO


   2/23/17 09:00


    2/24/17 10:25


 


 


 Iron Sucrose/


 Sodium Chloride


  (Venofer Inj/NS


 Inj)  110 ml @ 


 110 mls/hr  DAILY


 IV


   2/23/17 14:00


 2/25/17 09:59  2/24/17 10:26


 


 


 Acetaminophen/


 Hydrocodone Bitart


  (Norco  Mg)  1 tab  Q4H  PRN


 PO


 pain>2  2/23/17 13:45


    2/24/17 08:54


 








Objective Remarks


GENERAL: Elderly female, sitting up in bed in nad. 


SKIN: Warm and dry.


HEAD: Normocephalic.


EYES: No injection or drainage. 


NECK: Supple, trachea midline. 


CARDIOVASCULAR: Regular rate and rhythm


RESPIRATORY: Breath sounds equal bilaterally. No accessory muscle use.


GASTROINTESTINAL: Abdomen soft, non-tender, nondistended. 


EXTREMITIES: No cyanosis


NEUROLOGICAL: No obvious focal deficit. Awake, alert, and oriented x3.





Assessment/Plan


Problem List:  


(1) Iron deficiency anemia


Status:  Acute


Plan:  --secondary to GI blood loss.  GI w/u ongoing


--iron sucrose 200mg IV x 3 bags


--panendoscopy on 2/24 showed 3 shallow antral ulcers





Assessment


72y/o with iron deficiency anemia


h/o Chronic pain.


gastroenteritis.


Leukopenia.


Plan


1. follow up in clinic for IV iron once discharged


2. monitor CBC








Carolina Anton Feb 24, 2017 12:46








Carolina Anton Feb 24, 2017 12:46

## 2017-02-24 NOTE — HHI.DS
__________________________________________________





Discharge Summary


Admission Date


Feb 22, 2017 at 16:46


Discharge Date:  Feb 24, 2017


Admitting Diagnosis


symptomatic anemia, GI bleed





(1) Upper gastrointestinal bleed


ICD Code:  K92.2


(2) Symptomatic anemia


ICD Code:  D64.9


(3) Iron deficiency anemia


ICD Code:  D50.9


Procedures


none


Brief History - From Admission


71 year-old  female with a known history of anemia, presented to the 

ED for evaluation of 6 week history of generalized fatigue, shortness of breath 

and increased weakness and abnormal lab including hemoglobin of 6.8 drawn 

yesterday 02/21/17 by her PCP, which patient has seen due to her symptoms.  

Though she denies any melena or GI bleed, she had a positive fecal occult blood 

test in the ED.  Her last colonoscopy was in November 2015.  During her last 

hospitalization, hematology was consulted and patient was treated for iron 

deficiency anemia with Venofer transfusion.


CBC/BMP:  


2/24/17 0628                                                                   

             2/23/17 0802





Significant Findings





Laboratory Tests








Test 2/22/17 2/22/17 2/23/17 2/24/17





 15:40 15:50 08:02 06:28


 


Activated Partial 22.6 SEC   





Thromboplast Time (24.3-30.1)   


 


White Blood Count  2.7 TH/MM3 2.5 TH/MM3 3.5 TH/MM3





  (4.0-11.0) (4.0-11.0) (4.0-11.0)


 


Red Blood Count  3.22 MIL/MM3 3.29 MIL/MM3 3.20 MIL/MM3





  (4.00-5.30) (4.00-5.30) (4.00-5.30)


 


Hemoglobin  7.5 GM/DL 7.9 GM/DL 7.5 GM/DL





  (11.6-15.3) (11.6-15.3) (11.6-15.3)


 


Hematocrit  24.1 % 25.1 % 24.4 %





  (35.0-46.0) (35.0-46.0) (35.0-46.0)


 


Mean Corpuscular Volume  74.8 FL 76.3 FL 76.3 FL





  (80.0-100.0) (80.0-100.0) (80.0-100.0)


 


Mean Corpuscular Hemoglobin  23.3 PG 24.0 PG 23.6 PG





  (27.0-34.0) (27.0-34.0) (27.0-34.0)


 


Mean Corpuscular Hemoglobin  31.1 % 31.4 % 30.9 %





Concent  (32.0-36.0) (32.0-36.0) (32.0-36.0)


 


Red Cell Distribution Width  20.3 % 19.6 % 20.2 %





  (11.6-17.2) (11.6-17.2) (11.6-17.2)


 


Monocytes (%) (Auto)  11.3 % 11.9 % 10.6 %





  (0.0-8.0) (0.0-8.0) (0.0-8.0)


 


Neutrophils # (Auto)  1.3 TH/MM3 1.3 TH/MM3 





  (1.8-7.7) (1.8-7.7) 


 


Basophils %  3 % (0-2)  


 


Neutrophils # (Manual)  1.5 TH/MM3  





  (1.8-7.7)  


 


Tear Drop Cells  1+  (NORMAL)  


 


Ovalocytes  1+  (NORMAL)  


 


Chloride Level  108 MEQ/L 108 MEQ/L 





  () () 


 


Estimat Glomerular Filtration  63 ML/MIN (>89)  





Rate    


 


Iron Level  8 MCG/DL  





  ()  


 


Total Iron Binding Capacity  491 MCG/DL  





  (250-450)  


 


Percent Iron Saturation  1.6 % (20-50)  


 


Platelet Count   144 TH/MM3 





   (150-450) 


 


Lymphocytes # (Auto)   0.7 TH/MM3 0.8 TH/MM3





   (1.0-4.8) (1.0-4.8)


 


Target Cells   1+  (NORMAL) 


 


Calcium Level   8.4 MG/DL 





   (8.5-10.1) 


 


Aspartate Amino Transf   11 U/L (15-37) 





(AST/SGOT)    


 


Total Protein   6.0 GM/DL 





   (6.4-8.2) 


 


Albumin   3.1 GM/DL 





   (3.4-5.0) 


 


Vitamin B12 Level   1076 PG/ML 





   (193-986) 








PE at Discharge


GENERAL: NAD


SKIN: Warm and dry.


HEAD: Normocephalic.


EYES: No scleral icterus. No injection or drainage. 


NECK: Supple, trachea midline. No JVD or lymphadenopathy.


CARDIOVASCULAR: Regular rate and rhythm without murmurs, gallops, or rubs. 


RESPIRATORY: Breath sounds equal bilaterally. No accessory muscle use.


GASTROINTESTINAL: Abdomen soft, non-tender, nondistended. 


MUSCULOSKELETAL: No cyanosis, or edema. 


BACK: Nontender without obvious deformity. No CVA tenderness.





Hospital Course


She was admitted secondary to symptomatic anemia for which she was transfused 1 

unit packed red blood cell as well as Venofer 200 mg IV 2 doses due to iron 

deficiency anemia.  [facility] was consulted and patient underwent EGD 

on a very 20 04/20/17.  Hematology was also consulted.  DVT and GI prophylaxis 

were provided.  H&H improved prior to discharge.  Vitals remained stable.


Pt Condition on Discharge:  Stable


Discharge Disposition:  Discharge Home


Discharge Time:  <= 30 minutes


Discharge Instructions


DIET: Follow Instructions for:  Heart Healthy Diet


Activities you can perform:  Regular-No Restrictions


Follow up Referrals:  


Gastroenterology


Oncology


PCP Follow-up - 1 Week





New Medications:  


Ferrous Sulfate (Ferrous Sulfate) 325 Mg Tab


325 MG PO DAILY Nutritional Supplement #30 Ref 0 TAB


Naloxegol (Movantik) 25 Mg Tab


25 MG PO DAILY Prevent Constipation #30 Ref 0 TAB


Pantoprazole (Pantoprazole) 40 Mg Tab


40 MG PO DAILY Regulate Heart Beat #30 TAB


 


Continued Medications:  


Amphetamine-Dextroamphetamine (Adderall) 20 Mg Tab


20 MG PO BID Avoid late evening doses. Space doses at least 4 to 6 hours if 

more than once/day dosing. Hyperactivity Control #60 Ref 0 TAB


Gabapentin (Gabapentin) 300 Mg Cap


300 MG PO BID #60 Ref 0 CAP


Hydrocodone-Acetaminophen (Hydrocodone-Acetaminophen)  mg Tab


1 TAB PO Q6H PRN PAIN Ref 0 TAB


Tizanidine (Tizanidine) 2 Mg Tab


2 MG PO HS Muscle Spasm Ref 0 TAB











Ronald Mendoza MD Feb 24, 2017 13:01


Hydrocodone-Acetaminophen (Hydrocodone-Acetaminophen)  mg Tab


1 TAB PO Q6H PRN PAIN Ref 0 TAB


Tizanidine (Tizanidine) 2 Mg Tab


2 MG PO HS Muscle Spasm Ref 0 TAB











Ronald Mendoza MD Feb 24, 2017 13:01

## 2017-02-24 NOTE — MB
cc:

CATARINO HOGAN M.D., RUBY ANNE E. M.D. RICCI, DONATO R. M.D. KENDRICK, MARK M.D.

****

 

 

DATE OF CONSULTATION:  2/24/2017

 

YOB: 1945

 

HISTORY OF PRESENT ILLNESS

The patient is a 71-year-old female I was asked to see for further evaluation

and management of anemia.  This had been a chronic problem, in fact she has

undergone endoscopies and colonoscopies as far as 10 years back including a

small bowel capsule endoscopy and she tells me no specific lesion has been

discovered except for an ulcer that has healed as of her last endoscopy this

past April.  She does not take any acid suppressants routinely.  She may take

one about three or four times per month for mild heartburn symptoms.  She takes

hydrocodone daily and does take ibuprofen.  She has no upper GI symptomatology

at this time.  Her last colonoscopy had revealed a suboptimal prep.  Previous

colonoscopies had not revealed any pathology.  Her last examination was

performed in November 2015.

 

PAST MEDICAL HISTORY

1. Chronic anemia.

2. Chronic back pain status post several surgeries.

3. She has a hiatal hernia.

4. She has some reflux.

 

MEDICATIONS

Medications on admission:

1. Hydrocodone.

2. Ibuprofen.

3. Occasional acid suppressant.

 

ALLERGIES

None known to medications.

 

SOCIAL HISTORY

Tobacco use none.  Alcohol use two drinks per day.

 

FAMILY HISTORY

Cerebral aneurysm.  There is no known family history of colorectal disorders,

polyps or cancer.

 

REVIEW OF SYSTEMS

She has had some fatigue.  Her legs tend to swell toward the end of the day.

She has had no weight loss.  She has had no headaches or seizures or strokes.

No vision difficulties.  No dysphagia or odynophagia.  She has had some

dyspnea.  No urinary symptoms.  No rashes.  No new no joint pains, just the

chronic back pain.  No diarrhea or constipation.  No dysphagia or odynophagia.

 

 

PHYSICAL EXAMINATION

VITAL SIGNS:  Weight is 52 kg.  Temperature 95.8, pulse 61, respiratory rate

16, blood pressure 165/85.

GENERAL:  She is alert.  She is oriented x3.

HEENT:  She is anicteric.  Extraocular motions are intact.

LYMPH NODES:  I appreciate no submandibular, cervical, supraclavicular,

axillary or epitrochlear adenopathy.

LUNGS:  Clear to auscultation.

HEART:  Regular rate and rhythm with a 1-2/6 murmur.

ABDOMEN:  Good bowel sounds without appreciable bruit.  The abdomen is soft and

nontender.  No masses or hepatosplenomegaly noted.

EXTREMITIES:  No pedal edema at this time.  No Dupuytren's contractures or

palmar erythema.

RECTAL:  Exam had already been performed and revealed hemoccult positive brown

stool.

 

LABORATORY

Laboratory studies on admission 2/22/2017:  White count 2.7, hemoglobin 7.5,

MCV 74.8, platelets 170.  Yesterday white count 2.5, hemoglobin 7.9, MCV 76.3,

platelets 144, 1+ target cells, 1+ teardrop cells and 1+ ovalocytes.

 

INR 0.9.

 

Yesterday sodium 141, potassium 3.7, BUN 13, creatinine 0.62.

 

Iron 8, TIBC 491, saturation 1.6%.

 

Liver enzymes all normal.

 

B12 and folic acid levels normal.

 

IMAGING

Chest x-ray unremarkable.

 

IMPRESSION/PLAN

Chronic anemia, now with pancytopenia.  She has significant iron deficiency.

She underwent colonoscopy prep last night and drank about two-thirds of the

gallon but had essentially no results.  Given her use of ibuprofen and her lack

of acid suppression therapy to protect the mucosa, will proceed with

panendoscopy this morning.  I have reviewed the procedure with her, including

potential risks of medication reaction, bleeding, perforation and a small

chance of missing a lesion.  She agrees to proceed.  Will consider taking

duodenal biopsies to check for celiac disease.  If no significant abnormalities

are found, will consider outpatient colonoscopy using a three-day preparation.

Hematology evaluation would be appropriate as the patient tells me she has not

had pancytopenia before.  She tells me when Dr. Trivedi had seen her previously

she only had anemia so no bone marrow evaluation had been performed.

 

 

 

                              _________________________________

                              MD ERNST Mcfarlane/PAUL

D:  2/24/2017/6:51 AM

T:  2/24/2017/7:03 AM

Visit #:  [id number]

Job #:  [id number]

## 2017-02-24 NOTE — HHI.PR
Subjective


Remarks


Follow-up symptomatic anemia/GI bleed /iron deficiency anemia


02/23/17-patient seen and examined; she was transfused 1 unit packed red blood 

cell and no acute event overnight.  No report of GI bleed, hematuria or 

hemoptysis.


02/24/17-patient seen and examined, status post EGD, and unable to perform 

colonoscopy as patient did not have any bowel movement despite prep taken 

yesterday.  Stable today and denies any GI bleed





Objective


Vitals





 Vital Signs








  Date Time  Temp Pulse Resp B/P Pulse Ox O2 Delivery O2 Flow Rate FiO2


 


2/24/17 12:00 97.4 72 20 166/74 96   


 


2/24/17 09:54   20     


 


2/24/17 09:38    167/79    


 


2/24/17 08:48 96.9 67 20 167/79 100   


 


2/24/17 08:03  73 16 147/68 98   


 


2/24/17 07:55  72 16 129/68 96   


 


2/24/17 07:45 98.5 75 16 131/67 95   


 


2/24/17 00:00 95.8 61 16 165/85 97   


 


2/23/17 20:00 96.6 60 16 188/83 100   


 


2/23/17 16:00 96.7 77 16 145/77 98   








 I/O








 2/23/17 2/23/17 2/23/17 2/24/17 2/24/17 2/24/17





 07:00 15:00 23:00 07:00 15:00 23:00


 


Intake Total 200 ml 480 ml 480 ml  200 ml 


 


Output Total  1100 ml 1000 ml   


 


Balance 200 ml -620 ml -520 ml  200 ml 


 


      


 


Intake Oral 200 ml 480 ml 480 ml   


 


Other     200 ml 


 


Output Urine Total  1100 ml 1000 ml   


 


# Voids 2   2  


 


# Bowel Movements 0     








Result Diagram:  


2/24/17 0628                                                                   

             2/23/17 0802





Imaging





Last Impressions








Chest X-Ray 2/22/17 0000 Signed





Impressions: 





 Service Date/Time:  Wednesday, February 22, 2017 15:58 - CONCLUSION: No acute 





 disease     Bora Banerjee MD 








Objective Remarks


GENERAL: NAD


SKIN: Warm and dry.


HEAD: Normocephalic.


EYES: No scleral icterus. No injection or drainage. 


NECK: Supple, trachea midline. No JVD or lymphadenopathy.


CARDIOVASCULAR: Regular rate and rhythm without murmurs, gallops, or rubs. 


RESPIRATORY: Breath sounds equal bilaterally. No accessory muscle use.


GASTROINTESTINAL: Abdomen soft, non-tender, nondistended. 


MUSCULOSKELETAL: No cyanosis, or edema. 


BACK: Nontender without obvious deformity. No CVA tenderness.








A/P


Problem List:  


(1) Upper gastrointestinal bleed


ICD Code:  K92.2


Status:  Acute


(2) Symptomatic anemia


ICD Code:  D64.9


Status:  Acute


(3) Iron deficiency anemia


ICD Code:  D50.9


Status:  Acute


Assessment and Plan


71-year-old female with





1-Symptomatic anemia: H&H 7.5/24.1 and last H&H on 05/23/16 of 14.7/46.2; 

transfused 1 unit packed red blood cell 02/22/17 


2-GI bleed: As patient with fecal occult blood test positive along with 

symptomatic anemia, gastroenterology performed EGD 2/24/17 however was unable 

to perform colonoscopy patient did not have a bowel movement despite prep 

given.  PPI.


3- Ironic deficiency anemia: Likely secondary to chronic GI loss, transfused 2 

Venofer 200 mg IV daily 2 doses and appreciate input from hematology.  Start 

ferrous sulfate


4-History of chronic low back pain: Continue outpatient medications


5-Pancytopenia: Outpatient follow-up with hematology


6-DVT prophylaxis: Bilateral SCDs


7-Narcotic induced constipation: Movantik 25mg








Ronald Mendoza MD Feb 24, 2017 12:53

## 2018-01-15 ENCOUNTER — HOSPITAL ENCOUNTER (OUTPATIENT)
Dept: HOSPITAL 17 - PHPRE | Age: 73
End: 2018-01-15
Attending: PAIN MEDICINE
Payer: MEDICARE

## 2018-01-15 DIAGNOSIS — M54.5: ICD-10-CM

## 2018-01-15 DIAGNOSIS — B96.20: ICD-10-CM

## 2018-01-15 DIAGNOSIS — Z01.812: Primary | ICD-10-CM

## 2018-01-15 LAB
AUTOMATED NEUTROPHIL #: 1.8 TH/MM3 (ref 1.8–7.7)
BACTERIA, URINE: (no result) /HPF
BASOPHIL #: 0.1 TH/MM3 (ref 0–0.2)
BASOPHIL %: 1.6 % (ref 0–2)
BLOOD, URINE: (no result)
COMMENT (UR): (no result)
CULTURE IF INDICATED: (no result)
EOSINOPHIL #: 0.1 TH/MM3 (ref 0–0.4)
EOSINOPHIL %: 1.9 % (ref 0–4)
GLUCOSE,URINE: (no result) MG/DL
HEMATOCRIT: 30.4 % (ref 35–46)
HEMO FLAGS: (no result)
HEMOGLOBIN: 9.6 GM/DL (ref 11.6–15.3)
KETONE, URINE: (no result) MG/DL
LYMPH %: 27.3 % (ref 9–44)
LYMPHOCYTE #: 0.9 TH/MM3 (ref 1–4.8)
MEAN CELL VOLUME: 84.4 FL (ref 80–100)
MEAN CORPUSCULAR HEMOGLOBIN: 26.5 PG (ref 27–34)
MEAN CORPUSCULAR HGB CONC: 31.4 % (ref 32–36)
MEAN PLATELET VOLUME: 7.3 FL (ref 7–11)
MONO %: 10.6 % (ref 0–8)
MONOCYTE #: 0.3 TH/MM3 (ref 0–0.9)
NEUT %: 58.6 % (ref 16–70)
NITRITE,URINE: (no result)
PLATELET COUNT: 252 TH/MM3 (ref 150–450)
POTASSIUM: 4.2 MEQ/L (ref 3.5–5.1)
RED BLOOD COUNT: 3.6 MIL/MM3 (ref 4–5.3)
RED CELL DISTRIBUTION WIDTH: 15.6 % (ref 11.6–17.2)
SPECIFIC GRAVITY,URINE: 1 (ref 1–1.03)
SQUAMOUS EPITHELIAL CELL URINE: (no result) /HPF (ref 0–5)
URINE COLOR: (no result)
URINE LEUKOCYTE ESTERASE: (no result)
WHITE BLOOD COUNT: 3.2 TH/MM3 (ref 4–11)

## 2018-01-15 PROCEDURE — 85025 COMPLETE CBC W/AUTO DIFF WBC: CPT

## 2018-01-15 PROCEDURE — 87086 URINE CULTURE/COLONY COUNT: CPT

## 2018-01-15 PROCEDURE — 36415 COLL VENOUS BLD VENIPUNCTURE: CPT

## 2018-01-15 PROCEDURE — 87077 CULTURE AEROBIC IDENTIFY: CPT

## 2018-01-15 PROCEDURE — 81001 URINALYSIS AUTO W/SCOPE: CPT

## 2018-01-15 PROCEDURE — 87186 SC STD MICRODIL/AGAR DIL: CPT

## 2018-01-15 PROCEDURE — 84132 ASSAY OF SERUM POTASSIUM: CPT

## 2018-01-22 ENCOUNTER — HOSPITAL ENCOUNTER (OUTPATIENT)
Dept: HOSPITAL 17 - PHSDC | Age: 73
Discharge: HOME | End: 2018-01-22
Attending: PAIN MEDICINE
Payer: MEDICARE

## 2018-01-22 VITALS — HEIGHT: 66 IN | BODY MASS INDEX: 17.72 KG/M2 | WEIGHT: 110.23 LBS

## 2018-01-22 VITALS
RESPIRATION RATE: 16 BRPM | SYSTOLIC BLOOD PRESSURE: 145 MMHG | TEMPERATURE: 97.7 F | HEART RATE: 66 BPM | DIASTOLIC BLOOD PRESSURE: 77 MMHG | OXYGEN SATURATION: 100 %

## 2018-01-22 DIAGNOSIS — M96.1: Primary | ICD-10-CM

## 2018-01-22 DIAGNOSIS — M54.5: ICD-10-CM

## 2018-01-22 DIAGNOSIS — M79.661: ICD-10-CM

## 2018-01-22 DIAGNOSIS — Z98.1: ICD-10-CM

## 2018-01-22 PROCEDURE — 72020 X-RAY EXAM OF SPINE 1 VIEW: CPT

## 2018-01-22 PROCEDURE — 01936: CPT

## 2018-01-22 PROCEDURE — 76000 FLUOROSCOPY <1 HR PHYS/QHP: CPT

## 2018-01-22 PROCEDURE — 63650 IMPLANT NEUROELECTRODES: CPT

## 2018-01-22 PROCEDURE — C1778 LEAD, NEUROSTIMULATOR: HCPCS

## 2018-01-22 NOTE — RADRPT
EXAM DATE/TIME:  01/22/2018 15:11 

 

[facility] COMPARISON:     

No previous studies available for comparison.

 

                     

INDICATIONS :     

Back pain . Trial Stimulator placed in OR.

                     

 

MEDICAL HISTORY :     

None.          

 

SURGICAL HISTORY :     

None.   

 

ENCOUNTER:     

Initial                                        

 

ACUITY:     

1 day      

 

PAIN SCORE:     

Non-responsive.

 

LOCATION:       

Thoracic spine

 

FINDINGS:     

A single magnified C-arm spot views centered at the thoracolumbar junction. A graduated catheter over
lies the spine with the tips projecting towards the superior endplate of T10.

 

CONCLUSION:     

Limited image as detailed above.

 

 

 

 Richard Barboza Jr., MD on January 22, 2018 at 15:36           

Board Certified Radiologist.

 This report was verified electronically.

## 2018-01-23 NOTE — MP
cc:

URBANO LU M.D.

****

 

 

DATE OF SURGERY:

01/22/2018

 

YOB: 1945

 

PROCEDURE

implantation of [employer]tron[employer] Octrodes times two for spinal cord stimulation.

 

PREPROCEDURE DIAGNOSIS:

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

extremity pain.

 

POSTPROCEDURE DIAGNOSIS

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

extremity pain.

 

PROCEDURE NOTE

IV was started holding area the patient was given IV antibiotics. The consent

form was signed.  The surgical site was marked.  The patient was taken to the

operating room, placed in the prone position on the operating room table and

sedated and monitored by anesthesia.  All pressure points were checked and

padded.  Her back was prepped with Chloraprep and draped with sterile drapes.

Then fluoroscopy was used to visualize the T12-L1 interlaminar space.

 

The skin was infiltrated with 1% Xylocaine using a 27 gauge needle then to

modified Tuohy needles from the [employer] kit were advanced into the epidural

space at T12-L1 at the midline and slightly to the right of the midline using

fluoroscopic guidance and the loss-of-resistance technique.  Then two

Medtronics Octrodes were threaded in a cephalad direction slightly to the right

of the midline, until the cephalad tip of the electrodes was covering the

cephalad border of T10.  The caudal electrodes were covering the caudal border

of the T11.

 

The patient was awakened and stimulation took place at multiple electrode

combinations and that she was feeling good stimulation and her right buttocks,

right low back and down the leg to her foot.  The patient then was resedated

with the needles were removed.  The stylets were removed, each lead was

anchored to the interspinous ligament using a Silastic anchoring device.

 

Circumferentially tied with 2-0 Ethilon sutures.  Fluoroscopy was used confirm

the leads did not move during the anchoring process. Then distal extension

wires were connected to the stimulating leads and the connection was covered

with a Silastic cover secured at both ends with Ethibond suture.  Then

impedance was checked at the bedside and found to be appropriate in all of her

electrodes.  Then the lumbar subcutaneous area was expanded to make a

subcutaneous pocket.  The stimulating leads and the distal extension wires were

coiled and placed under the skin in the subcutaneous pocket.

 

Then a tunneling device was used to tunnel the distal extension a TIPS out of

further on the patient's left flank.  The incision was closed with 3-0 Monocryl

in the subcuticular tissue and 3-0 nylon on the skin and the incision was

covered with sterile adhesive dressings and the patient was taken to the

recovery room with stable vital signs.

 

                              _________________________________

                              W. MD PASQUALE Kiran/tello

D:  1/22/2018/3:39 PM

T:  1/23/2018/12:07 PM

Visit #:  [id number]

Job #:  [id number]

## 2018-02-01 ENCOUNTER — HOSPITAL ENCOUNTER (OUTPATIENT)
Dept: HOSPITAL 17 - PHSDC | Age: 73
Discharge: HOME | End: 2018-02-01
Attending: PAIN MEDICINE
Payer: MEDICARE

## 2018-02-01 VITALS
RESPIRATION RATE: 14 BRPM | DIASTOLIC BLOOD PRESSURE: 87 MMHG | SYSTOLIC BLOOD PRESSURE: 156 MMHG | OXYGEN SATURATION: 98 % | HEART RATE: 83 BPM

## 2018-02-01 VITALS — TEMPERATURE: 98.1 F

## 2018-02-01 VITALS — WEIGHT: 110.23 LBS | HEIGHT: 66 IN | BODY MASS INDEX: 17.72 KG/M2

## 2018-02-01 VITALS — HEART RATE: 71 BPM

## 2018-02-01 DIAGNOSIS — M54.16: Primary | ICD-10-CM

## 2018-02-01 DIAGNOSIS — Z98.1: ICD-10-CM

## 2018-02-01 DIAGNOSIS — M96.1: ICD-10-CM

## 2018-02-01 DIAGNOSIS — M54.5: ICD-10-CM

## 2018-02-01 DIAGNOSIS — G89.4: ICD-10-CM

## 2018-02-01 DIAGNOSIS — M79.661: ICD-10-CM

## 2018-02-01 PROCEDURE — 63685 INS/RPLC SPI NPG/RCVR POCKET: CPT

## 2018-02-01 PROCEDURE — C1767 GENERATOR, NEURO NON-RECHARG: HCPCS

## 2018-02-01 PROCEDURE — 00300 ANES ALL PX INTEG H/N/PTRUNK: CPT

## 2018-02-01 RX ADMIN — CEFAZOLIN SODIUM SCH MLS/HR: 1 POWDER, FOR SOLUTION INTRAMUSCULAR; INTRAVENOUS at 12:37

## 2018-02-01 RX ADMIN — CEFAZOLIN SODIUM SCH MLS/HR: 1 POWDER, FOR SOLUTION INTRAMUSCULAR; INTRAVENOUS at 12:34

## 2021-10-29 NOTE — MR
Called patient to explain he does not qualify for a telephone colon screening. NOT CURRENTLY ACTIVE ON [facility] [employer]. [address] [facility]    Phone room:     If patient returns the call please sign him up with [facility] and send the GI telephone colon screening questionnaires. Thank you! cc:

SERGIO NAVARRO M.D.

ALEJANDRO HARGROVE,MARK ANTHONY CASTELLON M.D.

****

 

 

DATE:  2/24/2017

 

YOB: 1945

 

TYPE OF PROCEDURE

Panendoscopy with biopsies performed by Dr. Sergio Navarro.

 

The patient is an inpatient.

 

INDICATION FOR PROCEDURE

Chronic anemia with hemoccult positive stool and significant iron deficiency.

She has no pancytopenia.  She has a history of ulcer disease.

 

MEDICATIONS

Sedation per anesthesiology.

 

INSTRUMENT

Pentax video gastroscope.

 

PROCEDURE

After obtaining written informed consent the patient was placed in a left

lateral decubitus position.  Adequate sedation was administered.  A video

gastroscope was passed under direct vision through the oropharynx into the

esophagus which appeared normal along its entire extent.  The Z-line appeared

normal at the GE junction at 35 cm.  The gastric mucosa was examined carefully

including views of the cardia and fundus with retroflexion.  Three shallow

white-based ulcers measuring 2-5 mm each were noted in the prepyloric antrum.

The pylorus, duodenal bulb and sweep appeared normal.  Gastric biopsies were

taken to check for H. pylori infection.  The instrument was then withdrawn and

the procedure was terminated.  The patient tolerated it well and there were no

apparent complications.  Upon completion the patient was sedated and had normal

stable vital signs.

 

IMPRESSION

1. Normal esophageal mucosa.

2. Normal Z-line at the GE junction at 35 cm.

3. Three shallow antral ulcers, likely related to the ibuprofen she has been

   using.  Biopsies were taken to check for H. Pylori.

4. Normal pylorus, duodenal bulb and sweep.

5. We recommended scheduling colonoscopy this morning at the same time but even

   though she took two-thirds of her GoLYTELY prep last evening she had

   essentially no bowel movements, so we did not attempt colonoscopy today.

   Her last colonoscopy in November 2015 was incomplete because of a suboptimal

   prep.

 

DISPOSITION

The patient is to be observed per routine post-procedure protocol.  She may

return to her room, resume her prior diet and medication.  She will continue

the oral Protonix that has been started.  She should minimize use of NSAIDs if

possible.  She has narcotic-induced constipation.  Will start Movantik at 25 mg

per day to try to help with that condition.  Consideration should be given to

reevaluation by hematology with Dr. Trivedi because of a pancytopenia.

 

 

 

                              _________________________________

                              MD MARY ANN Mcfarlane

D:  2/24/2017/7:49 AM

T:  2/24/2017/10:42 AM

Visit #:  [id number]

Job #:  [id number]

## 2024-12-30 NOTE — MP
[facility]  Physical Therapy Discharge Note    Name: Aimee Palumbo  Clinic Number: [id number]    Therapy Diagnosis:   Encounter Diagnosis   Name Primary?    Knee pain, unspecified chronicity, unspecified laterality Yes     Physician: Dillan Barrera MD    Physician Orders: PT Eval and treat  Medical Diagnosis:  M25.569  Knee pain  Evaluation Date: 11/20/24      Date of Last visit: 12/30/24  Total Visits Received: 9    Today's Visit:  Time IN: 0745  Time Out: 0830  Total time in minutes: 45    Treatment: Thera Ex 45  Includes:   Nustep  Standing gastroc tretch  Heel raises  Mini squats  Supine hamstring stretches  Quad sets  Hamstring sets  SAQ's  Bridging  Crooklying hip abduction./adduction  SLR    Reviewed Home Exercises    ASSESSMENT      Patient seen x 5 weeks of therapy. Decided to forego rest of treatment visit as she feel adequate progress and is now pain-free.  Achieved goals except no treadmill assessment included.    Discharge reason: Patient is now asymptomatic, Patient has met all of his/her goals, Patient has reached the maximum rehab potential for the present time, and Patient requested discharge    Discharge FOTO Score: 79/100    Short Term Goals:   Patient will report compliance with home exercise given.  (met)  Patient will demonstrate alternate steps ascending/descending stairs using one hand rail.  (met)     Long Term Goals:   Pain-free functional mobility 0/10 100% of the time.  (met)  Patient will demonstrate 10' on treadmill 1.3 mph  (not assessed)  Improve FOTO score 68/100.  (met)    PLAN   This patient is discharged from Physical Therapy      Eder Mejia, PT       cc:

PADMA LU

****

 

 

DATE OF SURGERY

2/1/2018

 

DATE OF BIRTH

1945

 

PROCEDURE

Implantation of Medtronics dual-channel rechargeable pulse generator for spinal

cord stimulation.

 

PREPROCEDURE DIAGNOSIS

Lumbar radiculopathy with intractable pain.

 

POSTPROCEDURE DIAGNOSIS

Lumbar radiculopathy with intractable pain.

 

PROCEDURE NOTE

IV was started in the holding area.  The patient was given IV antibiotics.

Consent forms were signed.  Surgical site was marked.  The patient was taken to

the operating room, given anesthesia, placed in the right lateral decubitus

position.  All pressure points were checked and padded. Then her lumbar area

and left abdominal area was prepped with Chloraprep and draped with sterile

drapes.

 

The left subcostal area was infiltrated with Marcaine 0.25% containing

epinephrine.  Then the lumbar incision was opened and the stimulating leads and

distal extension wires were exteriorized.  The distal extension wires were

disconnected from the stimulating leads by loosening Prem screws.  Then an

incision was made in the left subcostal area to make a subcutaneous pocket.

Then a tunneling device was used to tunnel the stimulating electrodes from the

lumbar incision to the subcutaneous pocket in the left subcostal area.  Then

the [employer]tronics dual-channel rechargeable pulse generator was connected to the

two stimulating electrodes by tightening Prem screws.  Impedance was checked

at the bedside and found to be appropriate in all the electrodes.  Then the

connection between the stimulating leads and the distal extension wires was

covered with a Silastic covers secured at both ends with 2-0 Ethibond suture.

 

 

 

Then the incisions were irrigated with Betadine.  The pulse generator was

placed in the subcutaneous pocket and anchored to the underlying fascia using

two 2-0 Ethibond sutures through the underlying fascia.  Then the incisions

were closed using 3-0 Monocryl in the subcuticular tissue and 3-0 nylon on the

skin.  The incisions were covered with sterile adhesive dressings and the

patient was taken to the recovery room with stable vital signs.

 

 

 

 

                              _________________________________

                              MD PASQUALE Almazan/RONEL

D:  2/1/2018/2:11 PM

T:  2/2/2018/12:26 PM

Visit #:  [id number]

Job #:  [id number]